# Patient Record
Sex: FEMALE | NOT HISPANIC OR LATINO | Employment: STUDENT | URBAN - METROPOLITAN AREA
[De-identification: names, ages, dates, MRNs, and addresses within clinical notes are randomized per-mention and may not be internally consistent; named-entity substitution may affect disease eponyms.]

---
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
## 2023-12-10 ENCOUNTER — HEALTH MAINTENANCE LETTER (OUTPATIENT)
Age: 18
End: 2023-12-10

## 2024-02-23 ENCOUNTER — OFFICE VISIT (OUTPATIENT)
Dept: FAMILY MEDICINE | Facility: CLINIC | Age: 19
End: 2024-02-23
Payer: COMMERCIAL

## 2024-02-23 VITALS
DIASTOLIC BLOOD PRESSURE: 65 MMHG | SYSTOLIC BLOOD PRESSURE: 107 MMHG | BODY MASS INDEX: 22.02 KG/M2 | OXYGEN SATURATION: 100 % | TEMPERATURE: 98 F | WEIGHT: 137 LBS | HEIGHT: 66 IN | RESPIRATION RATE: 16 BRPM | HEART RATE: 56 BPM

## 2024-02-23 DIAGNOSIS — Z00.00 PREVENTATIVE HEALTH CARE: Primary | ICD-10-CM

## 2024-02-23 LAB
ALBUMIN SERPL BCG-MCNC: 4.4 G/DL (ref 3.5–5.2)
ALP SERPL-CCNC: 84 U/L (ref 40–150)
ALT SERPL W P-5'-P-CCNC: 14 U/L (ref 0–50)
ANION GAP SERPL CALCULATED.3IONS-SCNC: 9 MMOL/L (ref 7–15)
AST SERPL W P-5'-P-CCNC: 19 U/L (ref 0–45)
BILIRUB SERPL-MCNC: 0.5 MG/DL
BUN SERPL-MCNC: 11.8 MG/DL (ref 6–20)
CALCIUM SERPL-MCNC: 9.2 MG/DL (ref 8.6–10)
CHLORIDE SERPL-SCNC: 101 MMOL/L (ref 98–107)
CHOLEST SERPL-MCNC: 197 MG/DL
CREAT SERPL-MCNC: 0.8 MG/DL (ref 0.51–0.95)
DEPRECATED HCO3 PLAS-SCNC: 26 MMOL/L (ref 22–29)
EGFRCR SERPLBLD CKD-EPI 2021: >90 ML/MIN/1.73M2
ERYTHROCYTE [DISTWIDTH] IN BLOOD BY AUTOMATED COUNT: 13.4 % (ref 10–15)
FASTING STATUS PATIENT QL REPORTED: NO
GLUCOSE SERPL-MCNC: 95 MG/DL (ref 70–99)
HCT VFR BLD AUTO: 38.2 % (ref 35–47)
HDLC SERPL-MCNC: 70 MG/DL
HGB BLD-MCNC: 12.3 G/DL (ref 11.7–15.7)
LDLC SERPL CALC-MCNC: 115 MG/DL
MCH RBC QN AUTO: 27.8 PG (ref 26.5–33)
MCHC RBC AUTO-ENTMCNC: 32.2 G/DL (ref 31.5–36.5)
MCV RBC AUTO: 86 FL (ref 78–100)
NONHDLC SERPL-MCNC: 127 MG/DL
PLATELET # BLD AUTO: 284 10E3/UL (ref 150–450)
POTASSIUM SERPL-SCNC: 4.3 MMOL/L (ref 3.4–5.3)
PROT SERPL-MCNC: 8 G/DL (ref 6.4–8.3)
RBC # BLD AUTO: 4.43 10E6/UL (ref 3.8–5.2)
SODIUM SERPL-SCNC: 136 MMOL/L (ref 135–145)
TRIGL SERPL-MCNC: 62 MG/DL
WBC # BLD AUTO: 8.2 10E3/UL (ref 4–11)

## 2024-02-23 PROCEDURE — 85027 COMPLETE CBC AUTOMATED: CPT | Performed by: FAMILY MEDICINE

## 2024-02-23 PROCEDURE — 99395 PREV VISIT EST AGE 18-39: CPT | Mod: 25 | Performed by: FAMILY MEDICINE

## 2024-02-23 PROCEDURE — 80053 COMPREHEN METABOLIC PANEL: CPT | Performed by: FAMILY MEDICINE

## 2024-02-23 PROCEDURE — 90480 ADMN SARSCOV2 VAC 1/ONLY CMP: CPT | Performed by: FAMILY MEDICINE

## 2024-02-23 PROCEDURE — 91320 SARSCV2 VAC 30MCG TRS-SUC IM: CPT | Performed by: FAMILY MEDICINE

## 2024-02-23 PROCEDURE — 80061 LIPID PANEL: CPT | Performed by: FAMILY MEDICINE

## 2024-02-23 PROCEDURE — 36415 COLL VENOUS BLD VENIPUNCTURE: CPT | Performed by: FAMILY MEDICINE

## 2024-02-23 ASSESSMENT — ENCOUNTER SYMPTOMS
DYSURIA: 0
PSYCHIATRIC NEGATIVE: 1
COUGH: 0
PARESTHESIAS: 0
SHORTNESS OF BREATH: 0
DIARRHEA: 0
FREQUENCY: 0
PALPITATIONS: 0
BRUISES/BLEEDS EASILY: 0
CHILLS: 0
WEAKNESS: 0
RHINORRHEA: 1
EYE PAIN: 0
ABDOMINAL PAIN: 0
CONSTIPATION: 0
MYALGIAS: 0
DIZZINESS: 0
ARTHRALGIAS: 0
SORE THROAT: 0
HEADACHES: 0
FEVER: 0

## 2024-02-23 NOTE — COMMUNITY RESOURCES LIST (ENGLISH)
02/23/2024   [facility] [employer]  N/A  For questions about this resource list or additional care needs, please contact your primary care clinic or care manager.  Phone: [phone]   Email: N/A   Address: [facility] [place] [address]   Hours: N/A        Hotlines and Helplines       Hotline - Housing crisis  1  [facility] (Main Office) Distance: 11.18 miles      Phone/Virtual   [address] [place] [address]  Language: English  Hours: Mon - Sun Open 24 Hours   Phone: (310) 660-2078 Email: info@[facility] Website: http://[facility].org     2  [facility] Distance: 17.93 miles      Phone/Virtual   [address] [place] [address]  Language: English  Hours: Mon - Sun Open 24 Hours   Phone: (896) 462-6212 Email: communications@[facility].org Website: https://[facility]-mn.org/oursaviourshousing/          Housing       Coordinated Entry access point  3  [facility] Distance: 16.95 miles      In-Person, Phone/Virtual   [address] [place] [address]  Language: English  Hours: Mon - Fri 8:30 AM - 4:30 PM  Fees: Free   Phone: (481) 415-3179 Email: info@[facility].org Website: https://www.[facility].org/locations/[facility]/     4  [facility] ([facility] - Housing Services Distance: 17.87 miles      In-Person   [address] [place] [address]  Language: English  Hours: Mon - Fri 9:00 AM - 5:00 PM  Fees: Free   Phone: (392) 199-1000 Email: housing@[facility].org Website: http://www.[facility].org/housing     Drop-in center or day shelter  5  [employer] - Drop-in Center Distance: 13.34 miles      In-Person   [address] [place] [address]  Language: English, Romanian  Hours: Tue 3:00 PM - 6:00 PM  Fees: Free   Phone: (641) 416-8311 Email: office@[facility].org Website: https://www.[facility].org/     6  [facility] - Safe Zone Distance: 17.24 miles      In-Person   [address] [place] [address]  Language: English  Hours: Mon - Fri 10:00 AM - 6:00 PM  Fees: Free   Phone: (768) 162-8847 Email: development@[employer] Website: https://[employer].org/support/youth/     Housing search assistance  7  [place] [employer] - Rental Homes for Future Homebuyers Program Distance: 10.18 miles      Phone/Virtual   [address] [place] [address]  Language: English  Hours: Mon - Fri 8:00 AM - 4:30 PM  Fees: Free   Phone: (704) 904-8417 Email: hra@[facility] Website: https://www.[facility]/hra/[place]-housing-and-bnwpgdycgjqze-fjjdarbro-gkq     8  [facility] Aging and Disability Services Distance: 13 miles      In-Person   [address] [place] [address]  Language: English  Hours: Mon - Fri 8:00 AM - 4:00 PM  Fees: Free, Insurance, Sliding Fee   Phone: (816) 102-5371 Email: tai@[facility]. Website: https://www.[facility]./HealthFamily/Disabilities     Shelter for families  9  [facility] Family [facility]ter Distance: 8.83 miles      In-Person   [address] [place] [address]  Language: English  Hours: Mon - Sun Open 24 Hours  Fees: Free, Sliding Fee   Phone: (687) 521-6793 Ext.1 Email: info@[facility].[employer] Website: http://www.[facility].org     Shelter for individuals  10  [facility] ([facility]) [facility], [facility] Distance: 3.73 miles      In-Person   [address] [place] [address]  Language: English, Mongolian  Hours: Mon - Fri 8:00 AM - 4:30 PM  Fees: Free   Phone: (109) 318-2704 Email: info@[employer].org Website: http://www.capagency.org     11  [facility] [place] - [facility] [place] [facility] - [facility] [place] [facility] Distance: 16.95 miles      In-Person   [address] [place] [address]  Language: English  Hours: Mon - Sun 5:00 PM - 10:00 AM  Fees: Free, Self Pay   Phone: (851) 858-8652  Email: info@[employer] Website: https://www.[employer].org/locations/[facility]/     Shelter for youth  12  [facility] Emergency Youth Shelter Distance: 15.35 miles      In-Person   [address] [place] [address]  Language: English  Hours: Mon - Sun Open 24 Hours  Fees: Free   Phone: (320) 289-8507 Email: info@[employer] Website: https://www.[employer]org/locations/[place]/     13  [facility] [place] - Emergency Youth Shelter Distance: 18.68 miles      In-Person   [address] [place] [address]  Language: English  Hours: Mon - Sun Open 24 Hours  Fees: Free   Phone: (632) 601-4811 Email: arlet@[facility].org Website: https://[facility].org/[facility]/          Important Numbers & Websites       Emergency Services   [address]  [place] [address]  Poison Control   (783) 695-7837  Suicide Prevention Lifeline   (513) 838-3200 (TALK)  Child Abuse Hotline   (288) 879-8346 (4-A-Child)  Sexual Assault Hotline   (680) 403-9507 (HOPE)  National Runaway Safeline   (154) 800-4285 (RUNAWAY)  All-Options Talkline   (204) 990-5433  Substance Abuse Referral   (604) 666-2732 (HELP)

## 2024-02-23 NOTE — COMMUNITY RESOURCES LIST (ENGLISH)
02/23/2024   [facility]  N/A  For questions about this resource list or additional care needs, please contact your primary care clinic or care manager.  Phone: [phone]   Email: N/A   Address: [facility] [place] [address]   Hours: N/A        Hotlines and Helplines       Hotline - Housing crisis  1  [facility] (Main Office) Distance: 11.18 miles      Phone/Virtual   [address] [place] [address]  Language: English  Hours: Mon - Sun Open 24 Hours   Phone: (947) 559-8900 Email: info@[facility] Website: http://[facility].org     2  [facility] Distance: 17.93 miles      Phone/Virtual   [address] [place] [address]  Language: English  Hours: Mon - Sun Open 24 Hours   Phone: (755) 274-7256 Email: communications@[facility].org Website: https://[facility]mn.org/oursaviourshousing/          Housing       Coordinated Entry access point  3  [facility] Distance: 16.95 miles      In-Person, Phone/Virtual   [address] [place] [address]  Language: English  Hours: Mon - Fri 8:30 AM - 4:30 PM  Fees: Free   Phone: (832) 108-9501 Email: info@[facility].org Website: https://www.[facility].org/locations/[facility]/     4  [facility] ([facility] - Housing Services Distance: 17.87 miles      In-Person   [address] [place] [address]  Language: English  Hours: Mon - Fri 9:00 AM - 5:00 PM  Fees: Free   Phone: (198) 851-2663 Email: housing@[facility].org Website: http://www.[facility].org/housing     Drop-in center or day shelter  5  [employer] - Drop-in Center Distance: 13.34 miles      In-Person   [address] [place] [address]  Language: English, Icelandic  Hours: Tue 3:00 PM - 6:00 PM  Fees: Free   Phone: (895) 417-6818 Email: office@[facility].org Website: https://www.[facility].org/     6  Face to Face - Safe Zone Distance: 17.24 miles      In-Person   [address] [place] [address]  Language: English  Hours: Mon - Fri 10:00 AM - 6:00 PM  Fees: Free   Phone: (297) 609-3265 Email: development@[employer] Website: https://[employer].com.org/support/youth/     Housing search assistance  7  [place] [employer] - Rental Homes for Future Homebuyers Program Distance: 10.18 miles      Phone/Virtual   [address] [place] [address]  Language: English  Hours: Mon - Fri 8:00 AM - 4:30 PM  Fees: Free   Phone: (135) 640-2464 Email: hra@[facility] Website: https://www.[facility]/hra/[place]-housing-and-dhoxfdyijhvwy-wkkeumxja-ffx     8  [facility] Aging and Disability Services Distance: 13 miles      In-Person   [address] [place] [address]  Language: English  Hours: Mon - Fri 8:00 AM - 4:00 PM  Fees: Free, Insurance, Sliding Fee   Phone: (366) 176-4452 Email: tai@[facility]. Website: https://www.[facility]./HealthFamily/Disabilities     Shelter for families  9  [facility] Family Shelter Distance: 8.83 miles      In-Person   [address] [place] [address]  Language: English  Hours: Mon - Sun Open 24 Hours  Fees: Free, Sliding Fee   Phone: (984) 542-8204 Ext.1 Email: info@[facility].[employer] Website: http://www.[facility].org     Shelter for individuals  10  [facility] ([facility]) [facility], [facility] Distance: 3.73 miles      In-Person   [address] [place] [address]  Language: English, French  Hours: Mon - Fri 8:00 AM - 4:30 PM  Fees: Free   Phone: (819) 753-4162 Email: info@[employer].org Website: http://www.capagency.org     11  [facility] - [facility] [place] [facility] - [facility] [place] [facility] Distance: 16.95 miles      In-Person   [address] [place] [address]  Language: English  Hours: Mon - Sun 5:00 PM - 10:00 AM  Fees: Free, Self Pay   Phone: (882) 679-5663  Email: info@[employer] Website: https://www.[employer].org/locations/[facility]/     Shelter for youth  12  [facility] - Emergency Youth Shelter Distance: 15.35 miles      In-Person   [address] [place] [address]  Language: English  Hours: Mon - Sun Open 24 Hours  Fees: Free   Phone: (956) 346-2774 Email: info@[employer] Website: https://www.[employer]org/locations/[place]/     13  [facility] [place] - Emergency Youth Shelter Distance: 18.68 miles      In-Person   [address] [place] [address]  Language: English  Hours: Mon - Sun Open 24 Hours  Fees: Free   Phone: (259) 113-7396 Email: arlet@[facility].org Website: https://[facility].org/[facility]/          Important Numbers & Websites       Emergency Services   [address]  [place] [address]  Poison Control   (792) 817-8734  Suicide Prevention Lifeline   (712) 396-3592 (TALK)  Child Abuse Hotline   (880) 102-8230 (4-A-Child)  Sexual Assault Hotline   (779) 163-4268 (HOPE)  National Runaway Safeline   (899) 687-1333 (RUNAWAY)  All-Options Talkline   (306) 536-7198  Substance Abuse Referral   (127) 333-4598 (HELP)

## 2024-02-23 NOTE — COMMUNITY RESOURCES LIST (ENGLISH)
02/23/2024   [email or web address]  N/A  For questions about this resource list or additional care needs, please contact your primary care clinic or care manager.  Phone: [phone]   Email: N/A   Address: [facility] [place] [address]   Hours: N/A        Hotlines and Helplines       Hotline - Housing crisis  1  [facility] (Main Office) Distance: 11.18 miles      Phone/Virtual   [address] [place] [address]  Language: English  Hours: Mon - Sun Open 24 Hours   Phone: (537) 588-6402 Email: info@[facility] Website: http://[facility].org     2  [facility] Distance: 17.93 miles      Phone/Virtual   [address] [place] [address]  Language: English  Hours: Mon - Sun Open 24 Hours   Phone: (745) 414-7499 Email: communications@[facility].org Website: https://[facility]mn.org/oursaviourshousing/          Housing       Coordinated Entry access point  3  [facility] Distance: 16.95 miles      In-Person, Phone/Virtual   [address] [place] [address]  Language: English  Hours: Mon - Fri 8:30 AM - 4:30 PM  Fees: Free   Phone: (323) 786-4518 Email: info@[facility].org Website: https://www.[facility].org/locations/[facility]/     4  [facility] ([facility] - Housing Services Distance: 17.87 miles      In-Person   [address] [place] [address]  Language: English  Hours: Mon - Fri 9:00 AM - 5:00 PM  Fees: Free   Phone: (956) 262-7898 Email: housing@[facility].org Website: http://www.[facility].org/housing     Drop-in center or day shelter  5  [employer] - Drop-in Center Distance: 13.34 miles      In-Person   [address] [place] [address]  Language: English, Armenian  Hours: Tue 3:00 PM - 6:00 PM  Fees: Free   Phone: (998) 378-8742 Email: office@[facility].org Website: https://www.[facility].org/     6  Face to Face - Safe Zone Distance: 17.24 miles      In-Person   [address] [place] [address]  Language: English  Hours: Mon - Fri 10:00 AM - 6:00 PM  Fees: Free   Phone: (327) 849-5779 Email: development@[employer] Website: https://[employer].org/support/youth/     Housing search assistance  7  [place] [employer] - Rental Homes for Future Homebuyers Program Distance: 10.18 miles      Phone/Virtual   [address] [place] [address]  Language: English  Hours: Mon - Fri 8:00 AM - 4:30 PM  Fees: Free   Phone: (250) 950-6191 Email: hra@[facility] Website: https://www.[facility]/hra/[place]-housing-and-ztyslsoufnkwx-vasakruzm-tcj     8  [facility] Aging and Disability Services Distance: 13 miles      In-Person   [address] [place] [address]  Language: English  Hours: Mon - Fri 8:00 AM - 4:00 PM  Fees: Free, Insurance, Sliding Fee   Phone: (340) 538-5920 Email: tai@[facility]. Website: https://www.[facility]./HealthFamily/Disabilities     Shelter for families  9  [facility] Family Shelter Distance: 8.83 miles      In-Person   [address] [place] [address]  Language: English  Hours: Mon - Sun Open 24 Hours  Fees: Free, Sliding Fee   Phone: (168) 378-9444 Ext.1 Email: info@[facility].[employer] Website: http://www.[facility].org     Shelter for individuals  10  [facility] [employer] [facility] ([facility]) [facility], [facility] Distance: 3.73 miles      In-Person   [address] [place] [address]  Language: English, Bulgarian  Hours: Mon - Fri 8:00 AM - 4:30 PM  Fees: Free   Phone: (782) 460-7598 Email: info@[employer].org Website: http://www.capagency.org     11  [facility] [place] - [facility] [place] [facility] - [facility] [place] [facility] Distance: 16.95 miles      In-Person   [address] [place] [address]  Language: English  Hours: Mon - Sun 5:00 PM - 10:00 AM  Fees: Free, Self Pay   Phone: (359) 773-8964  Email: info@[facility].[employer] Website: https://www.[facility].org/locations/[facility]/     Shelter for youth  12  [facility] and [facility] - Emergency Youth Shelter Distance: 15.35 miles      In-Person   [address] [place] [address]  Language: English  Hours: Mon - Sun Open 24 Hours  Fees: Free   Phone: (921) 803-2950 Email: info@[facility].[employer] Website: https://www.Clay.ioorg/locations/[place]/     13  [facility] - Emergency Youth Shelter Distance: 18.68 miles      In-Person   [address] [place] [address]  Language: English  Hours: Mon - Sun Open 24 Hours  Fees: Free   Phone: (397) 173-8586 Email: arlet@[facility]ity.org Website: https://[facility].org/[facility]/          Important Numbers & Websites       Emergency Services   [address]  [place] [address]  Poison Control   (727) 800-3746  Suicide Prevention Lifeline   (568) 302-7524 (TALK)  Child Abuse Hotline   (311) 132-2786 (4-A-Child)  Sexual Assault Hotline   (881) 375-9096 (HOPE)  National Runaway Safeline   (789) 742-5905 (RUNAWAY)  All-Options Talkline   (106) 420-8534  Substance Abuse Referral   (824) 131-9419 (HELP)

## 2024-02-23 NOTE — PROGRESS NOTES
Preventive Care Visit  [facility]  Regulo Manning MD, Family Medicine  Feb 23, 2024    Assessment & Plan     Assessment and Plan    (Z00.00) Preventative health care  (primary encounter diagnosis)  Comment:   Plan: Lipid panel reflex to direct LDL Fasting,         Comprehensive metabolic panel (BMP + Alb, Alk         Phos, ALT, AST, Total. Bili, TP), CBC with         platelets              RTC in 1y    Regulo Manning MD      Counseling  Appropriate preventive services were discussed with this patient, including applicable screening as appropriate for fall prevention, nutrition, physical activity, Tobacco-use cessation, weight loss and cognition.  Checklist reviewing preventive services available has been given to the patient.  Reviewed patient's diet, addressing concerns and/or questions.   She is at risk for lack of exercise and has been provided with information to increase physical activity for the benefit of her well-being.   The patient was instructed to see the dentist every 6 months.           Klaudia Romero is a 18 year old, presenting for the following:  Physical        2/23/2024     9:46 AM   Additional Questions   Roomed by mansoor bearden   Accompanied by self         2/23/2024     9:46 AM   Patient Reported Additional Medications   Patient reports taking the following new medications na        Health Care Directive  Patient does not have a Health Care Directive or Living Will:       HPI  Not currently sexually active, would like to stop NICHOLAS.  No menstrual concerns.    Follow up.        2/23/2024   General Health   How would you rate your overall physical health? Good   Feel stress (tense, anxious, or unable to sleep) Not at all         2/23/2024   Nutrition   Three or more servings of calcium each day? Yes   Diet: Regular (no restrictions)   How many servings of fruit and vegetables per day? (!) 2-3   How many sweetened beverages each day? 0-1         2/23/2024   Exercise    Days per week of moderate/strenous exercise 3 days    3 days   Average minutes spent exercising at this level 20 min    20 min         2/23/2024   Social Factors   Frequency of gathering with friends or relatives Once a week   Worry food won't last until get money to buy more No    No   Food not last or not have enough money for food? No    No   Do you have housing?  No    No   Are you worried about losing your housing? No    No   Lack of transportation? No    No   Unable to get utilities (heat,electricity)? No   Want help with housing or utility concern? No   (!) HOUSING CONCERN PRESENT      2/23/2024   Dental   Dentist two times every year? (!) NO         2/23/2024   TB Screening   Were you born outside of [place]?  No         Today's PHQ-2 Score:       2/23/2024     9:47 AM   PHQ-2 ( 1999 [employer])   Q1: Little interest or pleasure in doing things 0   Q2: Feeling down, depressed or hopeless 1   PHQ-2 Score 1   Q1: Little interest or pleasure in doing things Not at all   Q2: Feeling down, depressed or hopeless Several days   PHQ-2 Score 1           2/23/2024   Substance Use   Alcohol more than 3/day or more than 7/wk Not Applicable   Do you use any other substances recreationally? No     Social History     Tobacco Use    Smoking status: Never    Smokeless tobacco: Never   Substance Use Topics    Alcohol use: No    Drug use: No           2/23/2024   STI Screening   New sexual partner(s) since last STI/HIV test? No     History of abnormal Pap smear: NO - under age 21, PAP not appropriate for age             2/23/2024   Contraception/Family Planning   Questions about contraception or family planning No        Reviewed and updated as needed this visit by Provider                    Review of Systems   Constitutional:  Negative for chills and fever.   HENT:  Positive for rhinorrhea. Negative for congestion, ear pain and sore throat.    Eyes:  Negative for pain and visual disturbance.   Respiratory:  Negative for cough and  "shortness of breath.    Cardiovascular:  Negative for chest pain, palpitations and peripheral edema.   Gastrointestinal:  Negative for abdominal pain, constipation and diarrhea.   Endocrine: Negative for polyuria.   Genitourinary:  Negative for dysuria, frequency and vaginal discharge.   Musculoskeletal:  Negative for arthralgias and myalgias.   Skin:  Negative for rash.   Neurological:  Negative for dizziness, weakness, headaches and paresthesias.   Hematological:  Does not bruise/bleed easily.   Psychiatric/Behavioral: Negative.             Objective    Exam  There were no vitals taken for this visit.   Estimated body mass index is 19.5 kg/m  as calculated from the following:    Height as of 1/27/22: 1.651 m (5' 5\").    Weight as of 1/27/22: 53.2 kg (117 lb 3.2 oz).    Physical Exam  Vitals and nursing note reviewed.   Constitutional:       Appearance: Normal appearance.   HENT:      Head: Normocephalic.      Right Ear: Tympanic membrane, ear canal and external ear normal.      Left Ear: Tympanic membrane, ear canal and external ear normal.      Mouth/Throat:      Mouth: Mucous membranes are moist.      Pharynx: Oropharynx is clear.   Eyes:      Extraocular Movements: Extraocular movements intact.      Conjunctiva/sclera: Conjunctivae normal.      Pupils: Pupils are equal, round, and reactive to light.   Neck:      Thyroid: No thyroid mass or thyromegaly.   Cardiovascular:      Rate and Rhythm: Normal rate and regular rhythm.   Pulmonary:      Effort: Pulmonary effort is normal.      Breath sounds: Normal breath sounds.   Abdominal:      General: Bowel sounds are normal.      Palpations: Abdomen is soft. There is no mass.      Tenderness: There is no abdominal tenderness.   Musculoskeletal:         General: Normal range of motion.   Lymphadenopathy:      Cervical: No cervical adenopathy.   Skin:     General: Skin is warm and dry.   Neurological:      General: No focal deficit present.      Mental Status: She is " alert and oriented to person, place, and time.   Psychiatric:         Mood and Affect: Mood normal.         Behavior: Behavior normal.         Vision Screen  Vision Screen Details  Reason Vision Screen Not Completed: Patient had exam in last 12 months    Hearing Screen  Hearing Screen Not Completed  Reason Hearing Screen was not completed: Seen by audiologist in the past 12 months      Signed Electronically by: Regulo Manning MD

## 2024-02-23 NOTE — COMMUNITY RESOURCES LIST (ENGLISH)
02/23/2024   [facility] [address] [employer]  N/A  For questions about this resource list or additional care needs, please contact your primary care clinic or care manager.  Phone: [phone]   Email: N/A   Address: [facility] [place] [address]   Hours: N/A        Hotlines and Helplines       Hotline - Housing crisis  1  [facility] (Main Office) Distance: 11.18 miles      Phone/Virtual   [address] [place] [address]  Language: English  Hours: Mon - Sun Open 24 Hours   Phone: (999) 159-5670 Email: info@[facility] Website: http://[facility].org     2  [facility] Distance: 17.93 miles      Phone/Virtual   [address] [place] [address]  Language: English  Hours: Mon - Sun Open 24 Hours   Phone: (624) 596-9119 Email: communications@[facility] Website: https://[facility].org/oursaviourshousing/          Housing       Coordinated Entry access point  3  [facility] Distance: 16.95 miles      In-Person, Phone/Virtual   [address] [place] [address]  Language: English  Hours: Mon - Fri 8:30 AM - 4:30 PM  Fees: Free   Phone: (770) 670-1895 Email: info@[facility].org Website: https://www.[facility].org/locations/[facility]/     4  [facility] ([facility] - Housing Services Distance: 17.87 miles      In-Person   [address] [place] [address]  Language: English  Hours: Mon - Fri 9:00 AM - 5:00 PM  Fees: Free   Phone: (827) 998-7537 Email: housing@[facility].org Website: http://www.[facility].org/housing     Drop-in center or day shelter  5  [employer] - Drop-in Center Distance: 13.34 miles      In-Person   [address] [place] [address]  Language: English, Vietnamese  Hours: Tue 3:00 PM - 6:00 PM  Fees: Free   Phone: (642) 685-7573 Email: office@[facility].org Website: https://www.[facility].org/     6  [facility] Distance: 17.24 miles      In-Person   [address] [place] [address]  Language: English  Hours: Mon - Fri 10:00 AM - 6:00 PM  Fees: Free   Phone: (791) 479-2842 Email: development@[employer] Website: https://[employer].org/support/youth/     Housing search assistance  7  [facility] [employer] - Rental Homes for Future Homebuyers Program Distance: 10.18 miles      Phone/Virtual   [address] [place] [address]  Language: English  Hours: Mon - Fri 8:00 AM - 4:30 PM  Fees: Free   Phone: (497) 645-5741 Email: hra@[facility] Website: https://www.[facility]/hra/[place]-housing-and-aobedyhkpsygl-sqzvscukj-sut     8  [facility] Aging and Disability Services Distance: 13 miles      In-Person   [address] [place] [address]  Language: English  Hours: Mon - Fri 8:00 AM - 4:00 PM  Fees: Free, Insurance, Sliding Fee   Phone: (942) 772-6574 Email: tai@[facility]. Website: https://www.[facility]./HealthFamily/Disabilities     Shelter for families  9  [facility] Distance: 8.83 miles      In-Person   [address] [place] [address]  Language: English  Hours: Mon - Sun Open 24 Hours  Fees: Free, Sliding Fee   Phone: (906) 150-5832 Ext.1 Email: info@[facility].[employer] Website: http://www.[facility].org     Shelter for individuals  10  [facility] ([facility]) [facility] Distance: 3.73 miles      In-Person   [address] [place] [address]  Language: English, Irish  Hours: Mon - Fri 8:00 AM - 4:30 PM  Fees: Free   Phone: (269) 233-2373 Email: info@[employer].org Website: http://www.capagency.org     11  [facility] [place] [facility] [place] [facility] Distance: 16.95 miles      In-Person   [address] [place] [address]  Language: English  Hours: Mon - Sun 5:00 PM - 10:00 AM  Fees: Free, Self Pay   Phone: (358) 778-1523  Email: info@[employer] Website: https://www.[employer].org/locations/[facility]/     Shelter for youth  12  [facility] - Emergency Youth Shelter Distance: 15.35 miles      In-Person   [address] [place] [address]  Language: English  Hours: Mon - Sun Open 24 Hours  Fees: Free   Phone: (213) 783-9185 Email: info@[employer] Website: https://www.[employer]/locations/[place]/     13  [facility] - Emergency Youth Shelter Distance: 18.68 miles      In-Person   [address] [place] [address]  Language: English  Hours: Mon - Sun Open 24 Hours  Fees: Free   Phone: (788) 712-3900 Email: arlet@[facility].org Website: https://[facility].org/[facility]/          Important Numbers & Websites       Emergency Services   [address]  [place] [address]  Poison Control   (593) 900-1242  Suicide Prevention Lifeline   (185) 716-1621 (TALK)  Child Abuse Hotline   (634) 125-9430 (4-A-Child)  Sexual Assault Hotline   (905) 169-9392 (HOPE)  National Runaway Safeline   (849) 399-8477 (RUNAWAY)  All-Options Talkline   (238) 863-8093  Substance Abuse Referral   (873) 996-1284 (HELP)

## 2024-02-23 NOTE — COMMUNITY RESOURCES LIST (ENGLISH)
02/23/2024   [facility]  N/A  For questions about this resource list or additional care needs, please contact your primary care clinic or care manager.  Phone: [phone]   Email: N/A   Address: [facility] [place] [address]   Hours: N/A        Hotlines and Helplines       Hotline - Housing crisis  1  [facility] (Main Office) Distance: 11.18 miles      Phone/Virtual   [address] [place] [address]  Language: English  Hours: Mon - Sun Open 24 Hours   Phone: (878) 734-2316 Email: info@[facility] Website: http://[facility].org     2  [facility] Distance: 17.93 miles      Phone/Virtual   [address] [place] [address]  Language: English  Hours: Mon - Sun Open 24 Hours   Phone: (885) 707-4297 Email: communications@[facility].org Website: https://[facility].org/oursaviourshousing/          Housing       Coordinated Entry access point  3  [facility] Distance: 16.95 miles      In-Person, Phone/Virtual   [address] [place] [address]  Language: English  Hours: Mon - Fri 8:30 AM - 4:30 PM  Fees: Free   Phone: (542) 852-7795 Email: info@[facility].org Website: https://www.[facility].org/locations/[facility]/     4  [facility] ([facility] - Housing Services Distance: 17.87 miles      In-Person   [address] [place] [address]  Language: English  Hours: Mon - Fri 9:00 AM - 5:00 PM  Fees: Free   Phone: (395) 447-4065 Email: housing@[facility].org Website: http://www.[facility].org/housing     Drop-in center or day shelter  5  [employer] - Drop-in Center Distance: 13.34 miles      In-Person   [address] [place] [address]  Language: English, Bulgarian  Hours: Tue 3:00 PM - 6:00 PM  Fees: Free   Phone: (410) 154-8799 Email: office@[facility].org Website: https://www.[facility].org/     6  [facility] Distance: 17.24 miles      In-Person   [address] [place] [address]  Language: English  Hours: Mon - Fri 10:00 AM - 6:00 PM  Fees: Free   Phone: (247) 447-6690 Email: development@[employer] Website: https://[employer].org/support/youth/     Housing search assistance  7  [place] [employer] - Rental Homes for Future Homebuyers Program Distance: 10.18 miles      Phone/Virtual   [address] [place] [address]  Language: English  Hours: Mon - Fri 8:00 AM - 4:30 PM  Fees: Free   Phone: (685) 832-9383 Email: hra@[facility] Website: https://www.[facility]/hra/[place]-housing-and-wgpzvfuttujci-vdovgimem-haa     8  [facility] Aging and Disability Services Distance: 13 miles      In-Person   [address] [place] [address]  Language: English  Hours: Mon - Fri 8:00 AM - 4:00 PM  Fees: Free, Insurance, Sliding Fee   Phone: (372) 283-8778 Email: tai@[facility]. Website: https://www.[facility]./HealthFamily/Disabilities     Shelter for families  9  [facility] Distance: 8.83 miles      In-Person   [address] [place] [address]  Language: English  Hours: Mon - Sun Open 24 Hours  Fees: Free, Sliding Fee   Phone: (996) 883-1633 Ext.1 Email: info@[facility] Website: http://www.[facility].org     Shelter for individuals  10  [facility] ([facility]) [facility], [facility] Distance: 3.73 miles      In-Person   [address] [place] [address]  Language: English, Kyrgyz  Hours: Mon - Fri 8:00 AM - 4:30 PM  Fees: Free   Phone: (991) 500-7563 Email: info@[employer].org Website: http://www.capagency.org     11  [facility] and [place] - [facility] [place] [facility] - [facility] [place] [facility] Distance: 16.95 miles      In-Person   [address] [place] [address]  Language: English  Hours: Mon - Sun 5:00 PM - 10:00 AM  Fees: Free, Self Pay   Phone: (782) 823-4724  Email: info@[employer] Website: https://www.[employer].org/locations/[facility]/     Shelter for youth  12  [facility] - Emergency Youth Shelter Distance: 15.35 miles      In-Person   [address] [place] [address]  Language: English  Hours: Mon - Sun Open 24 Hours  Fees: Free   Phone: (357) 729-6777 Email: info@[employer] Website: https://www.[employer] Elementorg/locations/[place]/     13  [facility] - Emergency Youth Shelter Distance: 18.68 miles      In-Person   [address] [place] [address]  Language: English  Hours: Mon - Sun Open 24 Hours  Fees: Free   Phone: (469) 337-4281 Email: arlet@[facility]ationSessionMy.org Website: https://[facility].org/Critical access hospital/[facility]/          Important Numbers & Websites       Emergency Services   [address]  [place] [address]  Poison Control   (377) 575-3282  Suicide Prevention Lifeline   (536) 759-6752 (TALK)  Child Abuse Hotline   (157) 179-3578 (4-A-Child)  Sexual Assault Hotline   (170) 932-9985 (HOPE)  National Runaway Safeline   (729) 483-4191 (RUNAWAY)  All-Options Talkline   (129) 871-5337  Substance Abuse Referral   (194) 408-7526 (HELP)

## 2025-03-13 ENCOUNTER — TELEPHONE (OUTPATIENT)
Dept: FAMILY MEDICINE | Facility: CLINIC | Age: 20
End: 2025-03-13
Payer: COMMERCIAL

## 2025-03-13 NOTE — TELEPHONE ENCOUNTER
Patient Quality Outreach    Patient is due for the following:   Cervical Cancer Screening - PAP Needed  Physical Preventive Adult Physical      Topic Date Due    Diptheria Tetanus Pertussis (DTAP/TDAP/TD) Vaccine (4 - Td or Tdap) 02/19/2016    Meningitis B Vaccine (1 of 2 - Standard) Never done    Flu Vaccine (1) 09/01/2024    COVID-19 Vaccine (5 - 2024-25 season) 09/01/2024       Action(s) Taken:   Schedule a Adult Preventative    Type of outreach:    Sent [employer] message.    Questions for provider review:    None           Ambreen Hernandez

## 2025-05-20 ENCOUNTER — OFFICE VISIT (OUTPATIENT)
Dept: FAMILY MEDICINE | Facility: CLINIC | Age: 20
End: 2025-05-20
Payer: COMMERCIAL

## 2025-05-20 VITALS
WEIGHT: 130 LBS | RESPIRATION RATE: 18 BRPM | HEART RATE: 89 BPM | OXYGEN SATURATION: 100 % | TEMPERATURE: 97.9 F | SYSTOLIC BLOOD PRESSURE: 113 MMHG | BODY MASS INDEX: 20.89 KG/M2 | HEIGHT: 66 IN | DIASTOLIC BLOOD PRESSURE: 77 MMHG

## 2025-05-20 DIAGNOSIS — Z12.4 CERVICAL CANCER SCREENING: ICD-10-CM

## 2025-05-20 DIAGNOSIS — Z00.00 ROUTINE GENERAL MEDICAL EXAMINATION AT A HEALTH CARE FACILITY: Primary | ICD-10-CM

## 2025-05-20 DIAGNOSIS — Z23 NEED FOR VACCINATION: ICD-10-CM

## 2025-05-20 DIAGNOSIS — B02.9 HERPES ZOSTER WITHOUT COMPLICATION: ICD-10-CM

## 2025-05-20 PROCEDURE — 3078F DIAST BP <80 MM HG: CPT

## 2025-05-20 PROCEDURE — 99213 OFFICE O/P EST LOW 20 MIN: CPT | Mod: 25

## 2025-05-20 PROCEDURE — 90620 MENB-4C VACCINE IM: CPT

## 2025-05-20 PROCEDURE — 3074F SYST BP LT 130 MM HG: CPT

## 2025-05-20 PROCEDURE — 90480 ADMN SARSCOV2 VAC 1/ONLY CMP: CPT

## 2025-05-20 PROCEDURE — 90715 TDAP VACCINE 7 YRS/> IM: CPT

## 2025-05-20 PROCEDURE — 91320 SARSCV2 VAC 30MCG TRS-SUC IM: CPT

## 2025-05-20 PROCEDURE — 90471 IMMUNIZATION ADMIN: CPT

## 2025-05-20 PROCEDURE — 90472 IMMUNIZATION ADMIN EACH ADD: CPT

## 2025-05-20 PROCEDURE — 99395 PREV VISIT EST AGE 18-39: CPT | Mod: 25

## 2025-05-20 RX ORDER — VALACYCLOVIR HYDROCHLORIDE 1 G/1
1000 TABLET, FILM COATED ORAL 3 TIMES DAILY
Qty: 21 TABLET | Refills: 0 | Status: SHIPPED | OUTPATIENT
Start: 2025-05-20 | End: 2025-05-27

## 2025-05-20 SDOH — HEALTH STABILITY: PHYSICAL HEALTH: ON AVERAGE, HOW MANY DAYS PER WEEK DO YOU ENGAGE IN MODERATE TO STRENUOUS EXERCISE (LIKE A BRISK WALK)?: 5 DAYS

## 2025-05-20 SDOH — HEALTH STABILITY: PHYSICAL HEALTH: ON AVERAGE, HOW MANY MINUTES DO YOU ENGAGE IN EXERCISE AT THIS LEVEL?: 60 MIN

## 2025-05-20 ASSESSMENT — SOCIAL DETERMINANTS OF HEALTH (SDOH): HOW OFTEN DO YOU GET TOGETHER WITH FRIENDS OR RELATIVES?: ONCE A WEEK

## 2025-05-20 NOTE — PROGRESS NOTES
Preventive Care Visit  [facility]  COLLEEN Brumfield CNP, Nurse Practitioner Primary Care  May 20, 2025    Assessment & Plan     Routine general medical examination at a health care facility  Health maintenance updated     Herpes zoster without complication  Rash presents like shingles along dermatome, however unusual in presentation of symptoms. Will treat as shingles with valtrex and if no improvement in rash would try steroid cream. Advised patient to reach out if the rash does not improve after valtrex.  - valACYclovir (VALTREX) 1000 mg tablet; Take 1 tablet (1,000 mg) by mouth 3 times daily for 7 days.    Cervical cancer screening  - Pap Screen Only - Recommended Age 21 - 24 Years    Need for vaccination  - TDAP 10-64Y (ADACEL,BOOSTRIX)  - COVID-19 12+ (PFIZER)  - MENINGOCOCCAL B 10-25Y (BEXSERO )            Counseling  Appropriate preventive services were addressed with this patient via screening, questionnaire, or discussion as appropriate for fall prevention, nutrition, physical activity, Tobacco-use cessation, social engagement, weight loss and cognition.  Checklist reviewing preventive services available has been given to the patient.  Reviewed patient's diet, addressing concerns and/or questions.   The patient was instructed to see the dentist every 6 months.           Klaudia Romero is a 19 year old, presenting for the following:  Physical        5/20/2025     8:54 AM   Additional Questions   Roomed by Rena BERMUDEZ     Painful rash left leg - dry and itchy. Has been there for many months. No pain, burning, numbness, or shooting pain  Tried moisturizer but didn't help         5/20/2025   General Health   How would you rate your overall physical health? Good   Feel stress (tense, anxious, or unable to sleep) Not at all         5/20/2025   Nutrition   Three or more servings of calcium each day? Yes   Diet: Regular (no restrictions)   How many servings of fruit and  vegetables per day? (!) 0-1   How many sweetened beverages each day? 0-1         5/20/2025   Exercise   Days per week of moderate/strenous exercise 5 days   Average minutes spent exercising at this level 60 min         5/20/2025   Social Factors   Frequency of gathering with friends or relatives Once a week   Worry food won't last until get money to buy more No   Food not last or not have enough money for food? No   Do you have housing? (Housing is defined as stable permanent housing and does not include staying outside in a car, in a tent, in an abandoned building, in an overnight shelter, or couch-surfing.) No   Are you worried about losing your housing? No   Lack of transportation? No   Unable to get utilities (heat,electricity)? No   Want help with housing or utility concern? No   (!) HOUSING CONCERN PRESENT      5/20/2025   Dental   Dentist two times every year? (!) NO         Today's PHQ-2 Score:       5/20/2025     4:16 AM   PHQ-2 ( 1999 Pfizer)   Q1: Little interest or pleasure in doing things 1   Q2: Feeling down, depressed or hopeless 1   PHQ-2 Score 2    Q1: Little interest or pleasure in doing things Several days   Q2: Feeling down, depressed or hopeless Several days   PHQ-2 Score 2       Patient-reported           5/20/2025   Substance Use   Alcohol more than 3/day or more than 7/wk No   Do you use any other substances recreationally? (!) CANNABIS PRODUCTS     Social History     Tobacco Use    Smoking status: Never    Smokeless tobacco: Never   Vaping Use    Vaping status: Never Used   Substance Use Topics    Alcohol use: No    Drug use: No           5/20/2025   STI Screening   New sexual partner(s) since last STI/HIV test? No     History of abnormal Pap smear: No - age 21-29 PAP every 3 years recommended             5/20/2025   Contraception/Family Planning   Questions about contraception or family planning No       Reviewed and updated as needed this visit by Provider                    No past  "medical history on file.         Objective    Exam  /77 (BP Location: Right arm, Patient Position: Sitting, Cuff Size: Adult Regular)   Pulse 89   Temp 97.9  F (36.6  C) (Oral)   Resp 18   Ht 1.676 m (5' 6\")   Wt 59 kg (130 lb)   LMP 05/11/2025 (Exact Date)   SpO2 100%   BMI 20.98 kg/m     Estimated body mass index is 20.98 kg/m  as calculated from the following:    Height as of this encounter: 1.676 m (5' 6\").    Weight as of this encounter: 59 kg (130 lb).    Physical Exam  GENERAL: alert and no distress  EYES: Eyes grossly normal to inspection, and conjunctivae and sclerae normal  HENT: ear canals and TM's normal, and mouth without ulcers or lesions  RESP: lungs clear to auscultation - no rales, rhonchi or wheezes  CV: regular rate and rhythm, normal S1 S2, no S3 or S4, no murmur, click or rub, no peripheral edema  ABDOMEN: soft, nontender, no masses and bowel sounds normal   (female): normal female external genitalia, normal urethral meatus, normal vaginal mucosa  MS: no gross musculoskeletal defects noted, no edema  SKIN: erythematous, dry, linear rash LLE along L5 dermatome.   PSYCH: mentation appears normal, affect normal/bright        Signed Electronically by: COLLEEN Brumfield CNP    "

## 2025-05-20 NOTE — PATIENT INSTRUCTIONS
If the rash on your leg doesn't go away or improve, let me know and I will send in a steroid cream       Patient Education   Preventive Care Advice   This is general advice given by our system to help you stay healthy. However, your care team may have specific advice just for you. Please talk to your care team about your preventive care needs.  Nutrition  Eat 5 or more servings of fruits and vegetables each day.  Try wheat bread, brown rice and whole grain pasta (instead of white bread, rice, and pasta).  Get enough calcium and vitamin D. Check the label on foods and aim for 100% of the RDA (recommended daily allowance).  Lifestyle  Exercise at least 150 minutes each week  (30 minutes a day, 5 days a week).  Do muscle strengthening activities 2 days a week. These help control your weight and prevent disease.  No smoking.  Wear sunscreen to prevent skin cancer.  Have a dental exam and cleaning every 6 months.  Yearly exams  See your health care team every year to talk about:  Any changes in your health.  Any medicines your care team has prescribed.  Preventive care, family planning, and ways to prevent chronic diseases.  Shots (vaccines)   HPV shots (up to age 26), if you've never had them before.  Hepatitis B shots (up to age 59), if you've never had them before.  COVID-19 shot: Get this shot when it's due.  Flu shot: Get a flu shot every year.  Tetanus shot: Get a tetanus shot every 10 years.  Pneumococcal, hepatitis A, and RSV shots: Ask your care team if you need these based on your risk.  Shingles shot (for age 50 and up)  General health tests  Diabetes screening:  Starting at age 35, Get screened for diabetes at least every 3 years.  If you are younger than age 35, ask your care team if you should be screened for diabetes.  Cholesterol test: At age 39, start having a cholesterol test every 5 years, or more often if advised.  Bone density scan (DEXA): At age 50, ask your care team if you should have this scan  for osteoporosis (brittle bones).  Hepatitis C: Get tested at least once in your life.  STIs (sexually transmitted infections)  Before age 24: Ask your care team if you should be screened for STIs.  After age 24: Get screened for STIs if you're at risk. You are at risk for STIs (including HIV) if:  You are sexually active with more than one person.  You don't use condoms every time.  You or a partner was diagnosed with a sexually transmitted infection.  If you are at risk for HIV, ask about PrEP medicine to prevent HIV.  Get tested for HIV at least once in your life, whether you are at risk for HIV or not.  Cancer screening tests  Cervical cancer screening: If you have a cervix, begin getting regular cervical cancer screening tests starting at age 21.  Breast cancer scan (mammogram): If you've ever had breasts, begin having regular mammograms starting at age 40. This is a scan to check for breast cancer.  Colon cancer screening: It is important to start screening for colon cancer at age 45.  Have a colonoscopy test every 10 years (or more often if you're at risk) Or, ask your provider about stool tests like a FIT test every year or Cologuard test every 3 years.  To learn more about your testing options, visit:   .  For help making a decision, visit:   https://bit.ly/lz98969.  Prostate cancer screening test: If you have a prostate, ask your care team if a prostate cancer screening test (PSA) at age 55 is right for you.  Lung cancer screening: If you are a current or former smoker ages 50 to 80, ask your care team if ongoing lung cancer screenings are right for you.  For informational purposes only. Not to replace the advice of your health care provider. Copyright   2023 [facility]. All rights reserved. Clinically reviewed by the [facility] Transitions Program. [employer] [id number] - REV 01/24.

## 2025-05-23 ENCOUNTER — RESULTS FOLLOW-UP (OUTPATIENT)
Dept: OBGYN | Facility: CLINIC | Age: 20
End: 2025-05-23